# Patient Record
Sex: FEMALE | Race: WHITE | NOT HISPANIC OR LATINO
[De-identification: names, ages, dates, MRNs, and addresses within clinical notes are randomized per-mention and may not be internally consistent; named-entity substitution may affect disease eponyms.]

---

## 2021-03-10 ENCOUNTER — APPOINTMENT (OUTPATIENT)
Dept: OBGYN | Facility: CLINIC | Age: 41
End: 2021-03-10
Payer: MEDICAID

## 2021-03-10 VITALS — WEIGHT: 188.06 LBS | SYSTOLIC BLOOD PRESSURE: 145 MMHG | DIASTOLIC BLOOD PRESSURE: 86 MMHG

## 2021-03-10 DIAGNOSIS — Z78.9 OTHER SPECIFIED HEALTH STATUS: ICD-10-CM

## 2021-03-10 DIAGNOSIS — Z80.3 FAMILY HISTORY OF MALIGNANT NEOPLASM OF BREAST: ICD-10-CM

## 2021-03-10 PROCEDURE — 99203 OFFICE O/P NEW LOW 30 MIN: CPT

## 2021-03-10 PROCEDURE — 99072 ADDL SUPL MATRL&STAF TM PHE: CPT

## 2021-03-10 NOTE — COUNSELING
[Nutrition/ Exercise/ Weight Management] : nutrition, exercise, weight management [Body Image] : body image [Vitamins/Supplements] : vitamins/supplements [Sunscreen] : sunscreen [Smoking Cessation] : smoking cessation [Drugs/Alcohol] : drugs, alcohol [Breast Self Exam] : breast self exam [Bladder Hygiene] : bladder hygiene [STD (testing, results, tx)] : STD (testing, results, tx) [Vaccines] : vaccines

## 2021-03-11 LAB
C TRACH RRNA SPEC QL NAA+PROBE: NOT DETECTED
HPV HIGH+LOW RISK DNA PNL CVX: NOT DETECTED
N GONORRHOEA RRNA SPEC QL NAA+PROBE: NOT DETECTED
SOURCE TP AMPLIFICATION: NORMAL

## 2021-03-18 LAB — CYTOLOGY CVX/VAG DOC THIN PREP: ABNORMAL

## 2021-03-19 ENCOUNTER — APPOINTMENT (OUTPATIENT)
Dept: ANTEPARTUM | Facility: CLINIC | Age: 41
End: 2021-03-19
Payer: MEDICAID

## 2021-03-19 ENCOUNTER — ASOB RESULT (OUTPATIENT)
Age: 41
End: 2021-03-19

## 2021-03-19 PROCEDURE — 76830 TRANSVAGINAL US NON-OB: CPT

## 2021-03-19 PROCEDURE — 76856 US EXAM PELVIC COMPLETE: CPT | Mod: 59

## 2021-03-19 PROCEDURE — 99072 ADDL SUPL MATRL&STAF TM PHE: CPT

## 2021-03-26 ENCOUNTER — APPOINTMENT (OUTPATIENT)
Dept: OBGYN | Facility: CLINIC | Age: 41
End: 2021-03-26
Payer: MEDICAID

## 2021-03-26 VITALS
DIASTOLIC BLOOD PRESSURE: 82 MMHG | WEIGHT: 187.56 LBS | HEIGHT: 62 IN | SYSTOLIC BLOOD PRESSURE: 133 MMHG | BODY MASS INDEX: 34.52 KG/M2

## 2021-03-26 DIAGNOSIS — N92.1 EXCESSIVE AND FREQUENT MENSTRUATION WITH IRREGULAR CYCLE: ICD-10-CM

## 2021-03-26 PROCEDURE — 99072 ADDL SUPL MATRL&STAF TM PHE: CPT

## 2021-03-26 PROCEDURE — 58558Z: CUSTOM

## 2021-03-26 NOTE — PROCEDURE
[Hysteroscopy] : Hysteroscopy [Time out performed] : Pre-procedure time out performed.  Patient's name, date of birth and procedure confirmed. [Consent Obtained] : Consent obtained [Abnormal uterine bleeding] : abnormal uterine bleeding [Risks] : risks [Benefits] : benefits [Alternatives] : alternatives [Patient] : patient [Infection] : infection [Bleeding] : bleeding [Allergic Reaction] : allergic reaction [Lidocaine___ mL] : [unfilled] ~UmL of lidocaine [flexible] : Using aseptic technique a hysteroscopy was performed using a flexible hysteroscope [Sent to Pathology] : specimen was placed in buffered formalin and sent for pathology [Antibiotics given] : antibiotics not given [Hemostasis obtained] : hemostasis obtained [Tolerated Well] : Patient tolerated the procedure well [Aftercare instructions/regstrictions given and follow-up scheduled] : Aftercare instructions/restrictions given and follow-up scheduled [de-identified] : timeout performed\par under sterile conditions and with paracervical block the cervix was dilated and endometrial sampling obtained and sent to pathology\par . Hysteroscopy reveals normal endometrial contour with lush features. No polyps myoma or ulcerations noted.

## 2021-03-26 NOTE — HISTORY OF PRESENT ILLNESS
[Y] : Positive pregnancy history [Frequency: Q ___ days] : menstrual periods occur approximately every [unfilled] days [Menarche Age: ____] : age at menarche was [unfilled] [PapSmeardate] : 2018 [PGHxTotal] : 4 [[facility]] : 2 [PGHxPremature] : 0 [PGHxAbortions] : 0 [[facility] [place]] : 2 [PGHxABInduced] : 2 [PGHxABSpont] : 0 [PGHxEctopic] : 0 [PGHxMultBirths] : 0

## 2021-03-26 NOTE — PHYSICAL EXAM
[Appropriately responsive] : appropriately responsive [Alert] : alert [No Acute Distress] : no acute distress [No Lymphadenopathy] : no lymphadenopathy [Regular Rate Rhythm] : regular rate rhythm [No Murmurs] : no murmurs [Clear to Auscultation B/L] : clear to auscultation bilaterally [Soft] : soft [Non-tender] : non-tender [Non-distended] : non-distended [No HSM] : No HSM [No Lesions] : no lesions [No Mass] : no mass [Oriented x3] : oriented x3 [Examination Of The Breasts] : a normal appearance [No Masses] : no breast masses were palpable [Labia Majora] : normal [Labia Minora] : normal [Normal] : normal [Uterine Adnexae] : normal [Ovarian Mass (___ Cm)] : mass present [___] : [unfilled] cm mass on the left [Mass ___ cm] : no uterine mass was palpated

## 2021-03-30 LAB
CA 125 (LABCORP): 33.7 U/ML
HE 4: 40.7 PMOL/L
POSTMENOPAUSAL ROMA: 1.6
PREMENOPAUSAL ROMA: 0.49
ROMA COMMENT: NORMAL

## 2021-04-03 ENCOUNTER — APPOINTMENT (OUTPATIENT)
Dept: CT IMAGING | Facility: CLINIC | Age: 41
End: 2021-04-03

## 2021-04-05 LAB — CORE LAB BIOPSY: NORMAL

## 2021-04-07 ENCOUNTER — APPOINTMENT (OUTPATIENT)
Dept: OBGYN | Facility: CLINIC | Age: 41
End: 2021-04-07

## 2021-07-16 ENCOUNTER — APPOINTMENT (OUTPATIENT)
Dept: OBGYN | Facility: CLINIC | Age: 41
End: 2021-07-16
Payer: MEDICAID

## 2021-07-16 ENCOUNTER — LABORATORY RESULT (OUTPATIENT)
Age: 41
End: 2021-07-16

## 2021-07-16 VITALS
DIASTOLIC BLOOD PRESSURE: 86 MMHG | HEIGHT: 62 IN | BODY MASS INDEX: 34.23 KG/M2 | SYSTOLIC BLOOD PRESSURE: 130 MMHG | WEIGHT: 186 LBS

## 2021-07-16 DIAGNOSIS — Z97.5 PRESENCE OF (INTRAUTERINE) CONTRACEPTIVE DEVICE: ICD-10-CM

## 2021-07-16 PROCEDURE — 11982 REMOVE DRUG IMPLANT DEVICE: CPT

## 2021-07-16 PROCEDURE — 99072 ADDL SUPL MATRL&STAF TM PHE: CPT

## 2021-07-19 RX ORDER — NORETHINDRONE ACETATE AND ETHINYL ESTRADIOL AND FERROUS FUMARATE 1.5-30(21)
1.5-3 KIT ORAL
Qty: 84 | Refills: 2 | Status: ACTIVE | COMMUNITY
Start: 2021-07-19 | End: 1900-01-01

## 2021-08-10 ENCOUNTER — NON-APPOINTMENT (OUTPATIENT)
Age: 41
End: 2021-08-10

## 2021-08-11 ENCOUNTER — APPOINTMENT (OUTPATIENT)
Dept: OBGYN | Facility: CLINIC | Age: 41
End: 2021-08-11
Payer: MEDICAID

## 2021-08-11 VITALS
BODY MASS INDEX: 34.09 KG/M2 | SYSTOLIC BLOOD PRESSURE: 146 MMHG | DIASTOLIC BLOOD PRESSURE: 91 MMHG | WEIGHT: 185.25 LBS | HEIGHT: 62 IN | HEART RATE: 82 BPM

## 2021-08-11 DIAGNOSIS — N83.8 OTHER NONINFLAMMATORY DISORDERS OF OVARY, FALLOPIAN TUBE AND BROAD LIGAMENT: ICD-10-CM

## 2021-08-11 PROCEDURE — 99213 OFFICE O/P EST LOW 20 MIN: CPT

## 2021-09-21 ENCOUNTER — OUTPATIENT (OUTPATIENT)
Dept: OUTPATIENT SERVICES | Facility: HOSPITAL | Age: 41
LOS: 1 days | End: 2021-09-21
Payer: MEDICAID

## 2021-09-21 VITALS
DIASTOLIC BLOOD PRESSURE: 93 MMHG | SYSTOLIC BLOOD PRESSURE: 138 MMHG | HEART RATE: 91 BPM | HEIGHT: 60 IN | RESPIRATION RATE: 20 BRPM | WEIGHT: 183.87 LBS | TEMPERATURE: 97 F

## 2021-09-21 DIAGNOSIS — Z98.82 BREAST IMPLANT STATUS: Chronic | ICD-10-CM

## 2021-09-21 DIAGNOSIS — Z29.9 ENCOUNTER FOR PROPHYLACTIC MEASURES, UNSPECIFIED: ICD-10-CM

## 2021-09-21 DIAGNOSIS — N83.209 UNSPECIFIED OVARIAN CYST, UNSPECIFIED SIDE: ICD-10-CM

## 2021-09-21 DIAGNOSIS — Z01.818 ENCOUNTER FOR OTHER PREPROCEDURAL EXAMINATION: ICD-10-CM

## 2021-09-21 DIAGNOSIS — Z98.890 OTHER SPECIFIED POSTPROCEDURAL STATES: Chronic | ICD-10-CM

## 2021-09-21 LAB
ANION GAP SERPL CALC-SCNC: 13 MMOL/L — SIGNIFICANT CHANGE UP (ref 5–17)
BASOPHILS # BLD AUTO: 0.04 K/UL — SIGNIFICANT CHANGE UP (ref 0–0.2)
BASOPHILS NFR BLD AUTO: 0.4 % — SIGNIFICANT CHANGE UP (ref 0–2)
BLD GP AB SCN SERPL QL: SIGNIFICANT CHANGE UP
BUN SERPL-MCNC: 9.7 MG/DL — SIGNIFICANT CHANGE UP (ref 8–20)
CALCIUM SERPL-MCNC: 9.6 MG/DL — SIGNIFICANT CHANGE UP (ref 8.6–10.2)
CHLORIDE SERPL-SCNC: 101 MMOL/L — SIGNIFICANT CHANGE UP (ref 98–107)
CO2 SERPL-SCNC: 23 MMOL/L — SIGNIFICANT CHANGE UP (ref 22–29)
CREAT SERPL-MCNC: 0.86 MG/DL — SIGNIFICANT CHANGE UP (ref 0.5–1.3)
EOSINOPHIL # BLD AUTO: 0.5 K/UL — SIGNIFICANT CHANGE UP (ref 0–0.5)
EOSINOPHIL NFR BLD AUTO: 4.9 % — SIGNIFICANT CHANGE UP (ref 0–6)
GLUCOSE SERPL-MCNC: 88 MG/DL — SIGNIFICANT CHANGE UP (ref 70–99)
HCG SERPL-ACNC: <4 MIU/ML — SIGNIFICANT CHANGE UP
HCT VFR BLD CALC: 36.8 % — SIGNIFICANT CHANGE UP (ref 34.5–45)
HGB BLD-MCNC: 11.7 G/DL — SIGNIFICANT CHANGE UP (ref 11.5–15.5)
IMM GRANULOCYTES NFR BLD AUTO: 0.4 % — SIGNIFICANT CHANGE UP (ref 0–1.5)
LYMPHOCYTES # BLD AUTO: 3.13 K/UL — SIGNIFICANT CHANGE UP (ref 1–3.3)
LYMPHOCYTES # BLD AUTO: 30.9 % — SIGNIFICANT CHANGE UP (ref 13–44)
MCHC RBC-ENTMCNC: 25.4 PG — LOW (ref 27–34)
MCHC RBC-ENTMCNC: 31.8 GM/DL — LOW (ref 32–36)
MCV RBC AUTO: 80 FL — SIGNIFICANT CHANGE UP (ref 80–100)
MONOCYTES # BLD AUTO: 0.76 K/UL — SIGNIFICANT CHANGE UP (ref 0–0.9)
MONOCYTES NFR BLD AUTO: 7.5 % — SIGNIFICANT CHANGE UP (ref 2–14)
NEUTROPHILS # BLD AUTO: 5.67 K/UL — SIGNIFICANT CHANGE UP (ref 1.8–7.4)
NEUTROPHILS NFR BLD AUTO: 55.9 % — SIGNIFICANT CHANGE UP (ref 43–77)
PLATELET # BLD AUTO: 418 K/UL — HIGH (ref 150–400)
POTASSIUM SERPL-MCNC: 4.2 MMOL/L — SIGNIFICANT CHANGE UP (ref 3.5–5.3)
POTASSIUM SERPL-SCNC: 4.2 MMOL/L — SIGNIFICANT CHANGE UP (ref 3.5–5.3)
RBC # BLD: 4.6 M/UL — SIGNIFICANT CHANGE UP (ref 3.8–5.2)
RBC # FLD: 13.3 % — SIGNIFICANT CHANGE UP (ref 10.3–14.5)
SODIUM SERPL-SCNC: 137 MMOL/L — SIGNIFICANT CHANGE UP (ref 135–145)
WBC # BLD: 10.14 K/UL — SIGNIFICANT CHANGE UP (ref 3.8–10.5)
WBC # FLD AUTO: 10.14 K/UL — SIGNIFICANT CHANGE UP (ref 3.8–10.5)

## 2021-09-21 PROCEDURE — G0463: CPT

## 2021-09-21 RX ORDER — SODIUM CHLORIDE 9 MG/ML
3 INJECTION INTRAMUSCULAR; INTRAVENOUS; SUBCUTANEOUS EVERY 8 HOURS
Refills: 0 | Status: DISCONTINUED | OUTPATIENT
Start: 2021-10-11 | End: 2021-10-14

## 2021-09-21 NOTE — H&P PST ADULT - ASSESSMENT
CAPRINI SCORE [CLOT]    AGE RELATED RISK FACTORS                                                       MOBILITY RELATED FACTORS  [ ] Age 41-60 years                                            (1 Point)                  [ ] Bed rest                                                        (1 Point)  [ ] Age: 61-74 years                                           (2 Points)                 [ ] Plaster cast                                                   (2 Points)  [ ] Age= 75 years                                              (3 Points)                   [ ] Bed bound for more than 72 hours                 (2 Points)    DISEASE RELATED RISK FACTORS                                               GENDER SPECIFIC FACTORS  [ ] Edema in the lower extremities                       (1 Point)              [ ] Pregnancy                                                     (1 Point)  [ ] Varicose veins                                               (1 Point)                     [ ] Post-partum < 6 weeks                                   (1 Point)             [x ] BMI > 25 Kg/m2                                            (1 Point)                     [ ] Hormonal therapy  or oral contraception          (1 Point)                 [ ] Sepsis (in the previous month)                        (1 Point)                [ ] History of pregnancy complications                 (1 point)  [ ] Pneumonia or serious lung disease                                                [ ] Unexplained or recurrent                     (1 Point)           (in the previous month)                               (1 Point)  [ ] Abnormal pulmonary function test                     (1 Point)                 SURGERY RELATED RISK FACTORS  [ ] Acute myocardial infarction                              (1 Point)                   [ ]  Section                                             (1 Point)  [ ] Congestive heart failure (in the previous month)  (1 Point)           [ ] Minor surgery                                                  (1 Point)   [ ] Inflammatory bowel disease                             (1 Point)                   [ ] Arthroscopic surgery                                        (2 Points)  [ ] Central venous access                                      (2 Points)                    [ x] General surgery lasting more than 45 minutes   (2 Points)       [ ] Stroke (in the previous month)                          (5 Points)                 [ ] Elective arthroplasty                                         (5 Points)            [ ] Malignacy (past or present)                          (2 ponits)                                                                                                                            HEMATOLOGY RELATED FACTORS                                                 TRAUMA RELATED RISK FACTORS  [ ] Prior episodes of VTE                                     (3 Points)                [ ] Fracture of the hip, pelvis, or leg                       (5 Points)  [ ] Positive family history for VTE                         (3 Points)             [ ] Acute spinal cord injury (in the previous month)  (5 Points)  [ ] Prothrombin 89328 A                                     (3 Points)                [ ] Paralysis  (less than 1 month)                             (5 Points)  [ ] Factor V Leiden                                             (3 Points)                   [ ] Multiple Trauma within 1 month                        (5 Points)  [ ] Lupus anticoagulants                                     (3 Points)                                                           [ ] Anticardiolipin antibodies                               (3 Points)                                                       [ ] High homocysteine in the blood                      (3 Points)                                             [ ] Other congenital or acquired thrombophilia      (3 Points)                                                [ ] Heparin induced thrombocytopenia                  (3 Points)                                          Total Score [   3       ]    Caprini Score 0 - 2:  Low Risk, No VTE Prophylaxis required for most patients, encourage ambulation  Caprini Score 3 - 6:  At Risk, pharmacologic VTE prophylaxis is indicated for most patients (in the absence of a contraindication)  Caprini Score Greater than or = 7:  High Risk, pharmacologic VTE prophylaxis is indicated for most patients (in the absence of a contraindication)    41 year old female present c/o LLQ pelvic discomfort and b/l ovarian cyst.  She is now schedule for exploratory laparotomy, bilateral ovarian cystectomies, bilateral salpingectomies with Dr. Doyle on 10/11/2021    Patient educated on written and verbal preop instructions.    Patient verbalized understanding after "teach back" method of instruction were given   Medications reviewed, instructions given on what medications to take and what not to take.  Pt instructed to stop Herbals or anti-inflammatory meds one week prior to surgery and discuss with PMD.

## 2021-09-21 NOTE — H&P PST ADULT - NSICDXPASTMEDICALHX_GEN_ALL_CORE_FT
PAST MEDICAL HISTORY:  Asthma due to seasonal allergies     Ovarian cyst      PAST MEDICAL HISTORY:  Asthma due to seasonal allergies     Ovarian cyst     PCOS (polycystic ovarian syndrome)

## 2021-09-21 NOTE — H&P PST ADULT - PROBLEM SELECTOR PLAN 1
exploratory laparotomy, bilateral ovarian cystectomies, bilateral salpingectomies with Dr. Doyle on 10/11/2021

## 2021-09-21 NOTE — H&P PST ADULT - NSICDXPASTSURGICALHX_GEN_ALL_CORE_FT
PAST SURGICAL HISTORY:  H/O breast augmentation     H/O ovarian cystectomy x2     PAST SURGICAL HISTORY:  H/O breast augmentation     H/O ovarian cystectomy x2(2006,2008 )

## 2021-09-21 NOTE — H&P PST ADULT - NSICDXFAMILYHX_GEN_ALL_CORE_FT
FAMILY HISTORY:  Father  Still living? Unknown  FH: diabetes mellitus, Age at diagnosis: Age Unknown    Grandparent  Still living? Unknown  FH: diabetes mellitus, Age at diagnosis: Age Unknown    Aunt  Still living? Unknown  FHx: breast cancer, Age at diagnosis: Age Unknown

## 2021-09-21 NOTE — H&P PST ADULT - HISTORY OF PRESENT ILLNESS
41 year old female present today for PST. She c/o LLQ pelvic discomfort and b/l ovarian cyst.  She has a h/o of PCOS and has had two surgery for ovarian cystectomy.  She report every few years she would develop cyst and it grows to a size of a golf ball or bigger.  She is currently on birth control. She report LLQ pelvic discomfort.  She is now schedule for exploratory laparotomy, bilateral ovarian cystectomies, bilateral salpingectomies with Dr. Doyle on 10/11/2021

## 2021-09-22 PROBLEM — J45.909 UNSPECIFIED ASTHMA, UNCOMPLICATED: Chronic | Status: ACTIVE | Noted: 2021-09-21

## 2021-09-22 PROBLEM — E28.2 POLYCYSTIC OVARIAN SYNDROME: Chronic | Status: ACTIVE | Noted: 2021-09-21

## 2021-09-22 PROBLEM — N83.209 UNSPECIFIED OVARIAN CYST, UNSPECIFIED SIDE: Chronic | Status: ACTIVE | Noted: 2021-09-21

## 2021-10-05 DIAGNOSIS — Z01.818 ENCOUNTER FOR OTHER PREPROCEDURAL EXAMINATION: ICD-10-CM

## 2021-10-05 RX ORDER — CEFAZOLIN SODIUM 1 G
2000 VIAL (EA) INJECTION ONCE
Refills: 0 | Status: COMPLETED | OUTPATIENT
Start: 2021-10-11 | End: 2021-10-11

## 2021-10-05 RX ORDER — METRONIDAZOLE 500 MG
500 TABLET ORAL ONCE
Refills: 0 | Status: COMPLETED | OUTPATIENT
Start: 2021-10-11 | End: 2021-10-11

## 2021-10-08 ENCOUNTER — APPOINTMENT (OUTPATIENT)
Dept: DISASTER EMERGENCY | Facility: CLINIC | Age: 41
End: 2021-10-08

## 2021-10-08 LAB — SARS-COV-2 N GENE NPH QL NAA+PROBE: NOT DETECTED

## 2021-10-10 ENCOUNTER — TRANSCRIPTION ENCOUNTER (OUTPATIENT)
Age: 41
End: 2021-10-10

## 2021-10-11 ENCOUNTER — APPOINTMENT (OUTPATIENT)
Dept: OBGYN | Facility: HOSPITAL | Age: 41
End: 2021-10-11

## 2021-10-11 ENCOUNTER — RESULT REVIEW (OUTPATIENT)
Age: 41
End: 2021-10-11

## 2021-10-11 ENCOUNTER — INPATIENT (INPATIENT)
Facility: HOSPITAL | Age: 41
LOS: 2 days | Discharge: HOME | DRG: 742 | End: 2021-10-14
Attending: OBSTETRICS & GYNECOLOGY | Admitting: OBSTETRICS & GYNECOLOGY
Payer: MEDICAID

## 2021-10-11 VITALS
HEART RATE: 73 BPM | HEIGHT: 60 IN | OXYGEN SATURATION: 99 % | SYSTOLIC BLOOD PRESSURE: 154 MMHG | DIASTOLIC BLOOD PRESSURE: 94 MMHG | RESPIRATION RATE: 20 BRPM | WEIGHT: 183.87 LBS | TEMPERATURE: 99 F

## 2021-10-11 DIAGNOSIS — Z98.890 OTHER SPECIFIED POSTPROCEDURAL STATES: Chronic | ICD-10-CM

## 2021-10-11 DIAGNOSIS — Z98.82 BREAST IMPLANT STATUS: Chronic | ICD-10-CM

## 2021-10-11 DIAGNOSIS — N92.1 EXCESSIVE AND FREQUENT MENSTRUATION WITH IRREGULAR CYCLE: ICD-10-CM

## 2021-10-11 LAB — BLD GP AB SCN SERPL QL: SIGNIFICANT CHANGE UP

## 2021-10-11 PROCEDURE — 58700 REMOVAL OF FALLOPIAN TUBE: CPT

## 2021-10-11 PROCEDURE — 99221 1ST HOSP IP/OBS SF/LOW 40: CPT | Mod: 25,57

## 2021-10-11 PROCEDURE — 58925 REMOVAL OF OVARIAN CYST(S): CPT

## 2021-10-11 PROCEDURE — 88305 TISSUE EXAM BY PATHOLOGIST: CPT | Mod: 26

## 2021-10-11 PROCEDURE — 88302 TISSUE EXAM BY PATHOLOGIST: CPT | Mod: 26

## 2021-10-11 PROCEDURE — 88307 TISSUE EXAM BY PATHOLOGIST: CPT | Mod: 26

## 2021-10-11 PROCEDURE — 44140 PARTIAL REMOVAL OF COLON: CPT

## 2021-10-11 RX ORDER — OXYCODONE HYDROCHLORIDE 5 MG/1
5 TABLET ORAL
Refills: 0 | Status: DISCONTINUED | OUTPATIENT
Start: 2021-10-11 | End: 2021-10-12

## 2021-10-11 RX ORDER — ACETAMINOPHEN 500 MG
975 TABLET ORAL EVERY 6 HOURS
Refills: 0 | Status: DISCONTINUED | OUTPATIENT
Start: 2021-10-11 | End: 2021-10-12

## 2021-10-11 RX ORDER — CEFAZOLIN SODIUM 1 G
VIAL (EA) INJECTION
Refills: 0 | Status: COMPLETED | OUTPATIENT
Start: 2021-10-11 | End: 2021-10-12

## 2021-10-11 RX ORDER — METRONIDAZOLE 500 MG
500 TABLET ORAL EVERY 8 HOURS
Refills: 0 | Status: COMPLETED | OUTPATIENT
Start: 2021-10-12 | End: 2021-10-12

## 2021-10-11 RX ORDER — CEFAZOLIN SODIUM 1 G
2000 VIAL (EA) INJECTION EVERY 8 HOURS
Refills: 0 | Status: COMPLETED | OUTPATIENT
Start: 2021-10-12 | End: 2021-10-12

## 2021-10-11 RX ORDER — ACETAMINOPHEN 500 MG
975 TABLET ORAL ONCE
Refills: 0 | Status: COMPLETED | OUTPATIENT
Start: 2021-10-11 | End: 2021-10-11

## 2021-10-11 RX ORDER — METRONIDAZOLE 500 MG
TABLET ORAL
Refills: 0 | Status: COMPLETED | OUTPATIENT
Start: 2021-10-11 | End: 2021-10-12

## 2021-10-11 RX ORDER — IBUPROFEN 200 MG
600 TABLET ORAL EVERY 6 HOURS
Refills: 0 | Status: DISCONTINUED | OUTPATIENT
Start: 2021-10-11 | End: 2021-10-12

## 2021-10-11 RX ORDER — ONDANSETRON 8 MG/1
4 TABLET, FILM COATED ORAL ONCE
Refills: 0 | Status: DISCONTINUED | OUTPATIENT
Start: 2021-10-11 | End: 2021-10-11

## 2021-10-11 RX ORDER — CEFAZOLIN SODIUM 1 G
2000 VIAL (EA) INJECTION ONCE
Refills: 0 | Status: COMPLETED | OUTPATIENT
Start: 2021-10-11 | End: 2021-10-11

## 2021-10-11 RX ORDER — ENOXAPARIN SODIUM 100 MG/ML
40 INJECTION SUBCUTANEOUS EVERY 24 HOURS
Refills: 0 | Status: DISCONTINUED | OUTPATIENT
Start: 2021-10-12 | End: 2021-10-14

## 2021-10-11 RX ORDER — SIMETHICONE 80 MG/1
80 TABLET, CHEWABLE ORAL EVERY 6 HOURS
Refills: 0 | Status: DISCONTINUED | OUTPATIENT
Start: 2021-10-12 | End: 2021-10-12

## 2021-10-11 RX ORDER — ONDANSETRON 8 MG/1
8 TABLET, FILM COATED ORAL EVERY 8 HOURS
Refills: 0 | Status: DISCONTINUED | OUTPATIENT
Start: 2021-10-11 | End: 2021-10-12

## 2021-10-11 RX ORDER — SENNA PLUS 8.6 MG/1
1 TABLET ORAL AT BEDTIME
Refills: 0 | Status: DISCONTINUED | OUTPATIENT
Start: 2021-10-12 | End: 2021-10-12

## 2021-10-11 RX ORDER — GABAPENTIN 400 MG/1
300 CAPSULE ORAL EVERY 8 HOURS
Refills: 0 | Status: DISCONTINUED | OUTPATIENT
Start: 2021-10-11 | End: 2021-10-12

## 2021-10-11 RX ORDER — SODIUM CHLORIDE 9 MG/ML
1000 INJECTION, SOLUTION INTRAVENOUS
Refills: 0 | Status: DISCONTINUED | OUTPATIENT
Start: 2021-10-11 | End: 2021-10-12

## 2021-10-11 RX ORDER — OXYCODONE HYDROCHLORIDE 5 MG/1
10 TABLET ORAL EVERY 4 HOURS
Refills: 0 | Status: DISCONTINUED | OUTPATIENT
Start: 2021-10-11 | End: 2021-10-12

## 2021-10-11 RX ORDER — METRONIDAZOLE 500 MG
500 TABLET ORAL ONCE
Refills: 0 | Status: COMPLETED | OUTPATIENT
Start: 2021-10-11 | End: 2021-10-11

## 2021-10-11 RX ORDER — HYDROMORPHONE HYDROCHLORIDE 2 MG/ML
0.5 INJECTION INTRAMUSCULAR; INTRAVENOUS; SUBCUTANEOUS
Refills: 0 | Status: DISCONTINUED | OUTPATIENT
Start: 2021-10-11 | End: 2021-10-11

## 2021-10-11 RX ORDER — SODIUM CHLORIDE 9 MG/ML
1000 INJECTION, SOLUTION INTRAVENOUS
Refills: 0 | Status: DISCONTINUED | OUTPATIENT
Start: 2021-10-11 | End: 2021-10-11

## 2021-10-11 RX ADMIN — Medication 100 MILLIGRAM(S): at 20:58

## 2021-10-11 RX ADMIN — Medication 975 MILLIGRAM(S): at 21:55

## 2021-10-11 RX ADMIN — HYDROMORPHONE HYDROCHLORIDE 0.5 MILLIGRAM(S): 2 INJECTION INTRAMUSCULAR; INTRAVENOUS; SUBCUTANEOUS at 18:27

## 2021-10-11 RX ADMIN — HYDROMORPHONE HYDROCHLORIDE 0.5 MILLIGRAM(S): 2 INJECTION INTRAMUSCULAR; INTRAVENOUS; SUBCUTANEOUS at 18:48

## 2021-10-11 RX ADMIN — OXYCODONE HYDROCHLORIDE 10 MILLIGRAM(S): 5 TABLET ORAL at 23:33

## 2021-10-11 RX ADMIN — HYDROMORPHONE HYDROCHLORIDE 0.5 MILLIGRAM(S): 2 INJECTION INTRAMUSCULAR; INTRAVENOUS; SUBCUTANEOUS at 18:17

## 2021-10-11 RX ADMIN — Medication 975 MILLIGRAM(S): at 13:34

## 2021-10-11 RX ADMIN — OXYCODONE HYDROCHLORIDE 10 MILLIGRAM(S): 5 TABLET ORAL at 22:33

## 2021-10-11 RX ADMIN — GABAPENTIN 300 MILLIGRAM(S): 400 CAPSULE ORAL at 22:19

## 2021-10-11 RX ADMIN — Medication 100 MILLIGRAM(S): at 14:35

## 2021-10-11 RX ADMIN — Medication 975 MILLIGRAM(S): at 21:02

## 2021-10-11 RX ADMIN — Medication 200 MILLIGRAM(S): at 14:45

## 2021-10-11 RX ADMIN — Medication 600 MILLIGRAM(S): at 23:48

## 2021-10-11 RX ADMIN — Medication 100 MILLIGRAM(S): at 21:44

## 2021-10-11 RX ADMIN — HYDROMORPHONE HYDROCHLORIDE 0.5 MILLIGRAM(S): 2 INJECTION INTRAMUSCULAR; INTRAVENOUS; SUBCUTANEOUS at 18:57

## 2021-10-11 RX ADMIN — HYDROMORPHONE HYDROCHLORIDE 0.5 MILLIGRAM(S): 2 INJECTION INTRAMUSCULAR; INTRAVENOUS; SUBCUTANEOUS at 18:47

## 2021-10-11 RX ADMIN — HYDROMORPHONE HYDROCHLORIDE 0.5 MILLIGRAM(S): 2 INJECTION INTRAMUSCULAR; INTRAVENOUS; SUBCUTANEOUS at 18:38

## 2021-10-11 RX ADMIN — OXYCODONE HYDROCHLORIDE 10 MILLIGRAM(S): 5 TABLET ORAL at 18:59

## 2021-10-11 RX ADMIN — OXYCODONE HYDROCHLORIDE 10 MILLIGRAM(S): 5 TABLET ORAL at 19:59

## 2021-10-11 NOTE — BRIEF OPERATIVE NOTE - OPERATION/FINDINGS
Pt here for left ovariancystectomy iatrogenic sharp injury to sigmoid colon, no evidence of thermal injury. resection of 10cm of sigmoid colon for iatrogenic 60% full thickness perforation with 4cm surrounding deserosalization. Isoperistaltic colocolonic anastomosis with 2 layer handsewn closure of common colotomy. EBL 10ccs for ACS portion
Bilateral ovarian massess consistent with dermoid cysts filled with sebacous fluid and hair. Sigmoid colon adhered to left ovary resulting in iatrogenic perforation with immediate recognition and repair by Dr. Hyman. Right salpingectomy performed. Left fallopian tube indiscernible from left ovary. Otherwise grossly normal uterus.

## 2021-10-11 NOTE — CONSULT NOTE ADULT - SUBJECTIVE AND OBJECTIVE BOX
GENERAL SURGERY CONSULT    Consulting surgical team: ACS - Acute Care Surgery (pager [phone])  Consulting attending:  Patient seen and examined: 10-11-21 @ 17:34    HPI:  Patient is a 41y Female here for L ovariancystectomy b/l sapingectomy, intraoperative consult to ACS for iatrogenic sigmoid colon full thickness perforation.   ACS performed resection of 10cm of sigmoid colon for iatrogenic 60% full thickness perforation with 4cm surrounding deserosalization. Isoperistaltic colocolonic anastomosis with 2 layer handsewn closure of common colotomy    PAST MEDICAL HISTORY:  Ovarian cyst    Asthma due to seasonal allergies    PCOS (polycystic ovarian syndrome)        PAST SURGICAL HISTORY:  H/O ovarian cystectomy    H/O breast augmentation        ALLERGIES:  No Known Allergies      MEDICATIONS:  ceFAZolin   IVPB 2000 milliGRAM(s) IV Intermittent once  metroNIDAZOLE  IVPB 500 milliGRAM(s) IV Intermittent once      VITALS & I/Os:  Vital Signs Last 24 Hrs  T(C): 37.2 (11 Oct 2021 13:10), Max: 37.2 (11 Oct 2021 13:10)  T(F): 98.9 (11 Oct 2021 13:10), Max: 98.9 (11 Oct 2021 13:10)  HR: 73 (11 Oct 2021 13:10) (73 - 73)  BP: 154/94 (11 Oct 2021 13:10) (154/94 - 154/94)  BP(mean): --  RR: 20 (11 Oct 2021 13:10) (20 - 20)  SpO2: 99% (11 Oct 2021 13:10) (99% - 99%)    I&O's Summary      PHYSICAL EXAM:  General: No acute distress  Respiratory: Nonlabored  Cardiovascular: RRR  Abdominal:   Extremities: Warm  Vascular:    LABS:          Lactate:                    IMAGING:    
ACUTE CARE SURGERY CONSULT     HPI: 41y Female with history of multiple ovarian cystectomies who [facility] ex [facility], ovarian cystectomy, intraoperatively had a large left teratoma with multiple dense adhesions to abdominal organs, of most significance sigmoid colon, during dissection concern for full thickness injury of colon.  Due to this even there was an intraoperative consult called. Intraop assessment effectively showed a full thickness injury of approximately 60% of circumference of sigmoid colon.    ROS: unable to obtain patient intubated under General Anesthesia     PAST MEDICAL & SURGICAL HISTORY:  Ovarian cyst    Asthma due to seasonal allergies    PCOS (polycystic ovarian syndrome)    H/O ovarian cystectomy  x2(2006,2008 )    H/O breast augmentation      FAMILY HISTORY:  FH: diabetes mellitus (Grandparent, Father)    FHx: breast cancer (Aunt)      Family history not pertinent as reviewed with the patient.    SOCIAL HISTORY:  Denies any toxic habits    ALLERGIES: NKA No Known Allergies      HOME MEDICATIONS: ***  Home Medications:  JUNEL FE 1.5/30  1.5-30 MG-MCG TABS:  (11 Oct 2021 13:26)      --------------------------------------------------------------------------------------------    PHYSICAL EXAM:   General: Intubated under general anesthesia, in OR table  Abdomen: Open, evidence of full thickness injury of sigmoid colon of approximate 60% of circumference. edges viable  --------------------------------------------------------------------------------------------                    ASSESSMENT: Patient is a 41y old female with a iatrogenic injury of sigmoid colon, consulted intraoperatively, will need resection due to extent of compromise of circumference.     PLAN:    - Repair of iatrgogenic injury  - full details to follow in intraop note

## 2021-10-11 NOTE — BRIEF OPERATIVE NOTE - NSICDXBRIEFPREOP_GEN_ALL_CORE_FT
PRE-OP DIAGNOSIS:  Ovarian mass 11-Oct-2021 18:14:48  Jay Liang  
PRE-OP DIAGNOSIS:  Colon perforation 11-Oct-2021 17:29:54  Saul Coy

## 2021-10-11 NOTE — CONSULT NOTE ADULT - ATTENDING COMMENTS
patient seen and examined in the operating room. please refer to operative dictation. sigmoid colon resected, stapled anastamosis created. will follow. ok for CLD. management per GYN team

## 2021-10-11 NOTE — CHART NOTE - NSCHARTNOTEFT_GEN_A_CORE
POST-OPERATIVE NOTE    Subjective: Patient states pain is controlled, tolerated some clear liquids, peña in place, has not ambulated yet, afebrile  Patient is s/p exploratory laparotomy, right salpingectomy, b/l ovarian cystectomies with a colonic full thickness injury, repaired with resection and anastomosis     Vital Signs Last 24 Hrs  T(C): 36.3 (11 Oct 2021 20:00), Max: 37.2 (11 Oct 2021 13:10)  T(F): 97.4 (11 Oct 2021 20:00), Max: 98.9 (11 Oct 2021 13:10)  HR: 70 (11 Oct 2021 20:00) (64 - 85)  BP: 124/77 (11 Oct 2021 20:00) (101/62 - 154/94)  BP(mean): 70 (11 Oct 2021 19:40) (62 - 73)  RR: 18 (11 Oct 2021 20:00) (12 - 20)  SpO2: 96% (11 Oct 2021 20:00) (95% - 100%)  I&O's Detail    ceFAZolin   IVPB   metroNIDAZOLE  IVPB   ceFAZolin   IVPB   metroNIDAZOLE  IVPB     PAST MEDICAL & SURGICAL HISTORY:  Ovarian cyst    Asthma due to seasonal allergies    PCOS (polycystic ovarian syndrome)    H/O ovarian cystectomy  x2(2006,2008 )    H/O breast augmentation          Physical Exam:  General: NAD, resting comfortably in bed  Pulmonary: Nonlabored breathing, no respiratory distress  Cardiovascular: NSR  Abdominal: soft, NT/ND, no rebound or guarding, Incision is clean, dry, intact  Extremities: WWP                  Assessment:  The patient is a 41y Female who is now several hours post-op from a exploratory laparotomy, right salpingectomy, b/l ovarian cystectomies with a colonic full thickness injury, repaired with resection and anastomosis from surgical standpoint can have CLD and ADAT      Plan:  - Ok for CLD from surgery  - ADAT based on ROBF  - No need for IV ABx after perioperative period  - Rest of care per primary

## 2021-10-11 NOTE — BRIEF OPERATIVE NOTE - NSICDXBRIEFPROCEDURE_GEN_ALL_CORE_FT
PROCEDURES:  Colectomy, open 11-Oct-2021 17:28:36 resection of 10cm of sigmoid colon for iatrogenic 60% full thickness perforation with 4cm surrounding deserosalization. Isoperistaltic colocolonic anastomosis with 2 layer handsewn closure of common colotomy. Saul Coy  
PROCEDURES:  Exploratory laparotomy 11-Oct-2021 18:12:15  Jay Liang  Open bilateral ovarian cystectomy 11-Oct-2021 18:12:28  Jay Liang  Right salpingectomy 11-Oct-2021 18:13:02  Jay Liang  Abdominal washout 11-Oct-2021 18:13:12  Jay Liang

## 2021-10-11 NOTE — CONSULT NOTE ADULT - ASSESSMENT
41F here for elective b/l oophorectomy/salpingectomy, intraoperative ACS consult for iatrogenic sigmoid perforation POD0 resection of 10cm of sigmoid colon for iatrogenic 60% full thickness perforation with 4cm surrounding deserosalization. Isoperistaltic colocolonic anastomosis with 2 layer handsewn closure of common colotomy  NPO today sips/chips  CLD in AM and ADAT if tolerating with return of bowel function  No indication for antibiotics past 24hrs postop from general surgery standpoint  rest of care per GYN

## 2021-10-11 NOTE — PROGRESS NOTE ADULT - ASSESSMENT
NAE PROCTOR is a 41y now POD#0 s/p exploratory laparotomy, right salpingectomy, b/l ovarian cystectomies c/b bowel perforation s/p repair.    A/P:   Neuro: Pain well controlled. Continue current pain regimen.  CV: No history of cardiovascular disease. Blood pressure well controlled.  Pulm: No active disease. Saturating well on room air. Incentive spirometer use encouraged  GI: No active disease. Tolerating PO fluid. NPO except for water/ice chips. Advance to clears in AM.   : Hernandez in place. TOV after removal @0600.  Heme:  AM labs pending  ID: Afebrile. Ancef and flagyl ordered.  Endo: No active disease.   FEN: IVF at 125. Will discontinue IVF when tolerating PO. Electrolytes WNL. AM labs pending.   Skin: No active disease.   Psych: No active disease.   DVT ppx: Ambulation encouraged, SCDs when in bed, lovenox ordered.  Dispo: Pending labs and AM rounds.

## 2021-10-11 NOTE — PROGRESS NOTE ADULT - SUBJECTIVE AND OBJECTIVE BOX
NAE PROCTOR is a 41y now POD#0 s/p exploratory laparotomy, right salpingectomy, b/l ovarian cystectomies c/b bowel perforation s/p repair.    S:    Patient was seen and examined at bedside.   Patient has no complaints.  Pain is well controlled with current treatment regimen.   Tolerating regular diet, denies N/V.   Not yet ambulating.  - flatus/-BM/peña in place  She denies lightheadedness, dizziness, palpitations, chest pain and SOB.     O:   T(C): 36.3 (10-11-21 @ 20:00), Max: 37.2 (10-11-21 @ 13:10)  HR: 70 (10-11-21 @ 20:00) (64 - 85)  BP: 124/77 (10-11-21 @ 20:00) (101/62 - 154/94)  RR: 18 (10-11-21 @ 20:00) (12 - 20)  SpO2: 96% (10-11-21 @ 20:00) (95% - 100%)    Gen: NAD, AAOx3  Resp: breathing comfortably on RA  Abdomen: Soft, nondistended, appropriately tender  Incision: Pressure dressing in place - Clean, dry  Extremities: No calf tenderness or edema. SCDs in place.    Labs:   AM labs pending

## 2021-10-11 NOTE — BRIEF OPERATIVE NOTE - NSICDXBRIEFPOSTOP_GEN_ALL_CORE_FT
POST-OP DIAGNOSIS:  Teratoma of right ovary 11-Oct-2021 18:15:00  Jay Liang  
POST-OP DIAGNOSIS:  Colon perforation 11-Oct-2021 17:30:00  Saul Coy

## 2021-10-12 LAB
ANION GAP SERPL CALC-SCNC: 19 MMOL/L — HIGH (ref 5–17)
BUN SERPL-MCNC: 6.9 MG/DL — LOW (ref 8–20)
CALCIUM SERPL-MCNC: 8.7 MG/DL — SIGNIFICANT CHANGE UP (ref 8.6–10.2)
CHLORIDE SERPL-SCNC: 98 MMOL/L — SIGNIFICANT CHANGE UP (ref 98–107)
CO2 SERPL-SCNC: 19 MMOL/L — LOW (ref 22–29)
COVID-19 SPIKE DOMAIN AB INTERP: POSITIVE
COVID-19 SPIKE DOMAIN ANTIBODY RESULT: 48.9 U/ML — HIGH
CREAT SERPL-MCNC: 0.74 MG/DL — SIGNIFICANT CHANGE UP (ref 0.5–1.3)
GLUCOSE SERPL-MCNC: 143 MG/DL — HIGH (ref 70–99)
HCT VFR BLD CALC: 31.4 % — LOW (ref 34.5–45)
HGB BLD-MCNC: 10.3 G/DL — LOW (ref 11.5–15.5)
MAGNESIUM SERPL-MCNC: 1.4 MG/DL — LOW (ref 1.6–2.6)
MCHC RBC-ENTMCNC: 26.4 PG — LOW (ref 27–34)
MCHC RBC-ENTMCNC: 32.8 GM/DL — SIGNIFICANT CHANGE UP (ref 32–36)
MCV RBC AUTO: 80.5 FL — SIGNIFICANT CHANGE UP (ref 80–100)
PHOSPHATE SERPL-MCNC: 2.3 MG/DL — LOW (ref 2.4–4.7)
PLATELET # BLD AUTO: 382 K/UL — SIGNIFICANT CHANGE UP (ref 150–400)
POTASSIUM SERPL-MCNC: 4 MMOL/L — SIGNIFICANT CHANGE UP (ref 3.5–5.3)
POTASSIUM SERPL-SCNC: 4 MMOL/L — SIGNIFICANT CHANGE UP (ref 3.5–5.3)
RBC # BLD: 3.9 M/UL — SIGNIFICANT CHANGE UP (ref 3.8–5.2)
RBC # FLD: 12.5 % — SIGNIFICANT CHANGE UP (ref 10.3–14.5)
SARS-COV-2 IGG+IGM SERPL QL IA: 48.9 U/ML — HIGH
SARS-COV-2 IGG+IGM SERPL QL IA: POSITIVE
SODIUM SERPL-SCNC: 136 MMOL/L — SIGNIFICANT CHANGE UP (ref 135–145)
WBC # BLD: 18.43 K/UL — HIGH (ref 3.8–10.5)
WBC # FLD AUTO: 18.43 K/UL — HIGH (ref 3.8–10.5)

## 2021-10-12 PROCEDURE — 99024 POSTOP FOLLOW-UP VISIT: CPT | Mod: GC

## 2021-10-12 RX ORDER — ACETAMINOPHEN 500 MG
1000 TABLET ORAL EVERY 6 HOURS
Refills: 0 | Status: COMPLETED | OUTPATIENT
Start: 2021-10-12 | End: 2021-10-13

## 2021-10-12 RX ORDER — SODIUM CHLORIDE 9 MG/ML
1000 INJECTION, SOLUTION INTRAVENOUS
Refills: 0 | Status: DISCONTINUED | OUTPATIENT
Start: 2021-10-12 | End: 2021-10-14

## 2021-10-12 RX ORDER — HYDROMORPHONE HYDROCHLORIDE 2 MG/ML
0.5 INJECTION INTRAMUSCULAR; INTRAVENOUS; SUBCUTANEOUS
Refills: 0 | Status: DISCONTINUED | OUTPATIENT
Start: 2021-10-12 | End: 2021-10-14

## 2021-10-12 RX ORDER — ONDANSETRON 8 MG/1
4 TABLET, FILM COATED ORAL EVERY 4 HOURS
Refills: 0 | Status: DISCONTINUED | OUTPATIENT
Start: 2021-10-12 | End: 2021-10-14

## 2021-10-12 RX ORDER — HYDROMORPHONE HYDROCHLORIDE 2 MG/ML
1 INJECTION INTRAMUSCULAR; INTRAVENOUS; SUBCUTANEOUS
Refills: 0 | Status: DISCONTINUED | OUTPATIENT
Start: 2021-10-12 | End: 2021-10-14

## 2021-10-12 RX ADMIN — Medication 100 MILLIGRAM(S): at 15:02

## 2021-10-12 RX ADMIN — Medication 100 MILLIGRAM(S): at 05:16

## 2021-10-12 RX ADMIN — GABAPENTIN 300 MILLIGRAM(S): 400 CAPSULE ORAL at 05:15

## 2021-10-12 RX ADMIN — Medication 600 MILLIGRAM(S): at 05:19

## 2021-10-12 RX ADMIN — OXYCODONE HYDROCHLORIDE 5 MILLIGRAM(S): 5 TABLET ORAL at 06:14

## 2021-10-12 RX ADMIN — Medication 975 MILLIGRAM(S): at 08:56

## 2021-10-12 RX ADMIN — Medication 975 MILLIGRAM(S): at 09:26

## 2021-10-12 RX ADMIN — ENOXAPARIN SODIUM 40 MILLIGRAM(S): 100 INJECTION SUBCUTANEOUS at 05:18

## 2021-10-12 RX ADMIN — Medication 1000 MILLIGRAM(S): at 17:53

## 2021-10-12 RX ADMIN — SIMETHICONE 80 MILLIGRAM(S): 80 TABLET, CHEWABLE ORAL at 08:55

## 2021-10-12 RX ADMIN — SODIUM CHLORIDE 3 MILLILITER(S): 9 INJECTION INTRAMUSCULAR; INTRAVENOUS; SUBCUTANEOUS at 05:24

## 2021-10-12 RX ADMIN — ONDANSETRON 4 MILLIGRAM(S): 8 TABLET, FILM COATED ORAL at 10:11

## 2021-10-12 RX ADMIN — Medication 600 MILLIGRAM(S): at 00:29

## 2021-10-12 RX ADMIN — SODIUM CHLORIDE 125 MILLILITER(S): 9 INJECTION, SOLUTION INTRAVENOUS at 02:32

## 2021-10-12 RX ADMIN — HYDROMORPHONE HYDROCHLORIDE 1 MILLIGRAM(S): 2 INJECTION INTRAMUSCULAR; INTRAVENOUS; SUBCUTANEOUS at 12:53

## 2021-10-12 RX ADMIN — Medication 400 MILLIGRAM(S): at 17:38

## 2021-10-12 RX ADMIN — Medication 100 MILLIGRAM(S): at 15:03

## 2021-10-12 RX ADMIN — OXYCODONE HYDROCHLORIDE 5 MILLIGRAM(S): 5 TABLET ORAL at 05:14

## 2021-10-12 RX ADMIN — ONDANSETRON 4 MILLIGRAM(S): 8 TABLET, FILM COATED ORAL at 22:20

## 2021-10-12 RX ADMIN — Medication 100 MILLIGRAM(S): at 05:19

## 2021-10-12 RX ADMIN — SODIUM CHLORIDE 125 MILLILITER(S): 9 INJECTION, SOLUTION INTRAVENOUS at 17:39

## 2021-10-12 RX ADMIN — Medication 600 MILLIGRAM(S): at 05:24

## 2021-10-12 RX ADMIN — Medication 400 MILLIGRAM(S): at 23:51

## 2021-10-12 RX ADMIN — HYDROMORPHONE HYDROCHLORIDE 1 MILLIGRAM(S): 2 INJECTION INTRAMUSCULAR; INTRAVENOUS; SUBCUTANEOUS at 13:08

## 2021-10-12 NOTE — PROGRESS NOTE ADULT - SUBJECTIVE AND OBJECTIVE BOX
NAE PROCTOR is a 41y now POD#1 s/p exploratory lap, BS, bilateral ovarian cystectomies    S:    No acute events overnight.   Patient was seen and examined at bedside.   Patient has no complaints this AM.   Pain is well controlled with current treatment regimen.   Tolerating regular diet, denies N/V.   Has yet to ambulate.   -flatus/-BM/+ voiding  She denies lightheadedness, dizziness, palpitations, chest pain and SOB.     O:   T(C): 37.3 (10-12-21 @ 03:53), Max: 37.3 (10-12-21 @ 03:53)  HR: 90 (10-12-21 @ 03:53) (64 - 90)  BP: 131/78 (10-12-21 @ 03:53) (101/62 - 154/94)  RR: 18 (10-12-21 @ 03:53) (12 - 20)  SpO2: 97% (10-12-21 @ 03:53) (95% - 100%)    Gen: NAD, AAOx3  CV: RRR, S1 S2 present  Pulm: CTAB  Abdomen: Soft, nondistended, appropriately tender, + BS   Pelvic: Pad inspected with minimal amount of dark red blood   Incision: Clean, dry and intact  Extremities: No calf tenderness or edema     Labs:       10-11-21 @ 07:01  -  10-12-21 @ 07:00  --------------------------------------------------------  IN: 0 mL / OUT: 800 mL / NET: -800 mL               NAE PROCTOR is a 41y now POD#1 s/p exploratory lap, BS, bilateral ovarian cystectomies, with sigmoid resection and side-to-side re-anastomosis    S:    No acute events overnight.   Patient was seen and examined at bedside.   Patient has no complaints this AM.   Pain is well controlled with current treatment regimen.   Tolerating sips of water. Denies N/V.   Has yet to ambulate.   -flatus/-BM/+ voiding  She denies lightheadedness, dizziness, palpitations, chest pain and SOB.     O:   T(C): 37.3 (10-12-21 @ 03:53), Max: 37.3 (10-12-21 @ 03:53)  HR: 90 (10-12-21 @ 03:53) (64 - 90)  BP: 131/78 (10-12-21 @ 03:53) (101/62 - 154/94)  RR: 18 (10-12-21 @ 03:53) (12 - 20)  SpO2: 97% (10-12-21 @ 03:53) (95% - 100%)    Gen: NAD, AAOx3  CV: RRR, S1 S2 present  Pulm: CTAB  Abdomen: Soft, nondistended, appropriately tender, + BS   Pelvic: Pad inspected with minimal amount of dark red blood   Incision: Clean, dry and intact  Extremities: No calf tenderness or edema     Labs:       10-11-21 @ 07:01  -  10-12-21 @ 07:00  --------------------------------------------------------  IN: 0 mL / OUT: 800 mL / NET: -800 mL               NAE PROCTOR is a 41y now POD#1 s/p exploratory lap, BS, bilateral ovarian cystectomies, with sigmoid resection and side-to-side re-anastomosis    S:    No acute events overnight.   Patient was seen and examined at bedside.   Patient has no complaints this AM.   Pain is well controlled with current treatment regimen.   Tolerating sips of water. Denies N/V.   Has yet to ambulate.   -flatus/-BM/+ voiding  She denies lightheadedness, dizziness, palpitations, chest pain and SOB.     O:   T(C): 37.3 (10-12-21 @ 03:53), Max: 37.3 (10-12-21 @ 03:53)  HR: 90 (10-12-21 @ 03:53) (64 - 90)  BP: 131/78 (10-12-21 @ 03:53) (101/62 - 154/94)  RR: 18 (10-12-21 @ 03:53) (12 - 20)  SpO2: 97% (10-12-21 @ 03:53) (95% - 100%)    Gen: NAD, AAOx3  CV: RRR, S1 S2 present  Pulm: CTAB  Abdomen: Soft, nondistended, appropriately tender, + BS   Pelvic: Pad inspected with minimal amount of dark red blood   Incision: Clean, dry and intact, pressure dressing removed.  Extremities: No calf tenderness or edema     Labs:       10-11-21 @ 07:01  -  10-12-21 @ 07:00  --------------------------------------------------------  IN: 0 mL / OUT: 800 mL / NET: -800 mL

## 2021-10-12 NOTE — PROGRESS NOTE ADULT - ASSESSMENT
41F here for elective b/l oophorectomy/salpingectomy, intraoperative ACS consult for iatrogenic sigmoid perforation POD1 resection of 10cm of sigmoid colon for iatrogenic 60% full thickness perforation with 4cm surrounding deserosalization. Isoperistaltic colocolonic anastomosis with 2 layer handsewn closure of common colotomy      CLD  and ADAT if tolerating with return of bowel function  No indication for antibiotics past 24hrs postop from general surgery standpoint  rest of care per GYN

## 2021-10-12 NOTE — PROGRESS NOTE ADULT - ASSESSMENT
A/P: NAE PROCTOR is a 41y now POD#1 s/p exploratory lap, BS, bilateral ovarian cystectomies  Neuro: Pain well controlled. Continue current pain regimen.  CV: No history of cardiovascular disease. Blood pressure well controlled.  Pulm: No active disease. Saturating well on room air. Incentive spirometer use encouraged  GI: No active disease. Bowel sounds and function normal, tolerating PO diet. Continue current bowel regimen.   : Hernandez removed, voiding spontaneously.  Heme: Hgb 11.7 -> AM labs pending  ID: Afebrile. No antibiotics indicated at this time.   Endo: No active disease.   FEN: IVF to be discontinued, pt tolerating PO. Electrolytes WNL. AM labs pending.   Skin: No active disease.   Psych: No active disease.   DVT ppx: Ambulation encouraged, SCDs when in bed, lovenox ordered.  Dispo: Pending labs and AM rounds.    NAE PROCTOR is a 41y now POD#1 s/p exploratory lap, BS, bilateral ovarian cystectomies, with sigmoid resection and side-to-side re-anastomosis    Neuro: Pain well controlled. Continue current pain regimen.  CV: No history of cardiovascular disease. Blood pressure well controlled.  Pulm: No active disease. Saturating well on room air. Incentive spirometer use encouraged  GI: s/p sigmoid resection with side-to-side re-anastomosis. Bowel sounds present. No N/V. Plan to advance to CLD today. Appreciate ACS recommendations.   : Hernandez removed, voiding spontaneously.  Heme: Hgb 11.7 -> AM labs pending  ID: Afebrile. No antibiotics indicated at this time.   Endo: No active disease.   FEN: IVF to be discontinued. Electrolytes WNL. AM labs pending.   Skin: No active disease.   Psych: No active disease.   DVT ppx: Ambulation encouraged, SCDs when in bed, lovenox ordered.  Dispo: continue inpatient care   NAE PROCTOR is a 41y now POD#1 s/p exploratory lap, BS, bilateral ovarian cystectomies, with sigmoid resection and side-to-side re-anastomosis    Neuro: Pain well controlled. Continue current pain regimen.  CV: No history of cardiovascular disease. Blood pressure well controlled.  Pulm: No active disease. Saturating well on room air. Incentive spirometer use encouraged  GI: s/p sigmoid resection with side-to-side re-anastomosis. Bowel sounds present. No N/V. Plan to advance to CLD today. Appreciate ACS recommendations.   : Hernandez removed, voiding spontaneously.  Heme: Hgb 11.7 -> AM labs pending  ID: Afebrile. No antibiotics indicated at this time.   Endo: No active disease.   FEN: IVF to be discontinued. Electrolytes WNL. AM labs pending.   DVT ppx: Ambulation encouraged, SCDs when in bed, lovenox ordered.  Dispo: continue inpatient care, when meeting post op milestones and pending attending approval

## 2021-10-12 NOTE — PROGRESS NOTE ADULT - SUBJECTIVE AND OBJECTIVE BOX
HPI/OVERNIGHT EVENTS: Patient seen and examined at bedside this AM. Tolerated sips of clears yesterday, paincontrolled, has a peña afebrile    Vital Signs Last 24 Hrs  T(C): 36.6 (12 Oct 2021 00:03), Max: 37.2 (11 Oct 2021 13:10)  T(F): 97.8 (12 Oct 2021 00:03), Max: 98.9 (11 Oct 2021 13:10)  HR: 88 (12 Oct 2021 00:03) (64 - 88)  BP: 122/76 (12 Oct 2021 00:03) (101/62 - 154/94)  BP(mean): 70 (11 Oct 2021 19:40) (62 - 73)  RR: 18 (12 Oct 2021 00:03) (12 - 20)  SpO2: 95% (12 Oct 2021 00:03) (95% - 100%)    I&O's Detail      Constitutional: patient resting comfortably in bed, in no acute distress  HEENT: EOMI, PERRLA, MMM.  Respiratory: Non labored breathing on RA  Cardiovascular: RRR  Gastrointestinal: Abdomen soft, non-tender, non-distended, no rebound tenderness / guarding dressing C/D/I  Musculoskeletal: No joint pain, swelling or deformity; no limitation of movement  Vascular: Extremities warm and well perfused.     LABS:                MEDICATIONS  (STANDING):  acetaminophen   Tablet .. 975 milliGRAM(s) Oral every 6 hours  ceFAZolin   IVPB      ceFAZolin   IVPB 2000 milliGRAM(s) IV Intermittent every 8 hours  enoxaparin Injectable 40 milliGRAM(s) SubCutaneous every 24 hours  gabapentin 300 milliGRAM(s) Oral every 8 hours  ibuprofen  Tablet. 600 milliGRAM(s) Oral every 6 hours  lactated ringers. 1000 milliLiter(s) (125 mL/Hr) IV Continuous <Continuous>  metroNIDAZOLE  IVPB      metroNIDAZOLE  IVPB 500 milliGRAM(s) IV Intermittent every 8 hours  senna 1 Tablet(s) Oral at bedtime  sodium chloride 0.9% lock flush 3 milliLiter(s) IV Push every 8 hours    MEDICATIONS  (PRN):  acetaminophen   Tablet .. 975 milliGRAM(s) Oral every 6 hours PRN Mild Pain (1 - 3)  ondansetron    Tablet 8 milliGRAM(s) Oral every 8 hours PRN Nausea and/or Vomiting  oxyCODONE    IR 5 milliGRAM(s) Oral every 3 hours PRN Moderate Pain (4 - 6)  oxyCODONE    IR 10 milliGRAM(s) Oral every 4 hours PRN Severe Pain (7 - 10)  simethicone 80 milliGRAM(s) Chew every 6 hours PRN Gas

## 2021-10-12 NOTE — CHART NOTE - NSCHARTNOTEFT_GEN_A_CORE
S: Pt seen and examined at bedside. She is doing better, pain is under control with medication. She had an episode of nausea and vomiting earlier but has now resolved. She has been NPO and has been tolerating water and ice chips without nausea.  She is voiding, however not yet passed flatus.    O:  T(F): 97.8 (12 Oct 2021 12:53), Max: 99.2 (12 Oct 2021 03:53)  HR: 84 (12 Oct 2021 12:53) (64 - 90)  BP: 124/73 (12 Oct 2021 12:53) (101/62 - 131/78)  RR: 18 (12 Oct 2021 12:53) (12 - 20)  SpO2: 95% (12 Oct 2021 08:37) (95% - 100%)    Gen: NAD, AAOx3  CV: RRR, S1 S2 present  Pulm: CTAB  Abdomen: Soft, nondistended, appropriately tender, + BS   Pelvic: Pad inspected with minimal amount of dark red blood   Incision: Clean, dry and intact, pressure dressing removed.  Extremities: No calf tenderness or edema    A/P  Pt tolerating water and ice chips  Plan to advance to CLD diet, per ACS recs  Pain controlled with current regimen  c/w routine post op care

## 2021-10-13 LAB
ANION GAP SERPL CALC-SCNC: 8 MMOL/L — SIGNIFICANT CHANGE UP (ref 5–17)
BASOPHILS # BLD AUTO: 0.03 K/UL — SIGNIFICANT CHANGE UP (ref 0–0.2)
BASOPHILS NFR BLD AUTO: 0.2 % — SIGNIFICANT CHANGE UP (ref 0–2)
BUN SERPL-MCNC: 5.3 MG/DL — LOW (ref 8–20)
CALCIUM SERPL-MCNC: 7.9 MG/DL — LOW (ref 8.6–10.2)
CHLORIDE SERPL-SCNC: 104 MMOL/L — SIGNIFICANT CHANGE UP (ref 98–107)
CO2 SERPL-SCNC: 25 MMOL/L — SIGNIFICANT CHANGE UP (ref 22–29)
CREAT SERPL-MCNC: 0.7 MG/DL — SIGNIFICANT CHANGE UP (ref 0.5–1.3)
EOSINOPHIL # BLD AUTO: 0.09 K/UL — SIGNIFICANT CHANGE UP (ref 0–0.5)
EOSINOPHIL NFR BLD AUTO: 0.7 % — SIGNIFICANT CHANGE UP (ref 0–6)
GLUCOSE SERPL-MCNC: 90 MG/DL — SIGNIFICANT CHANGE UP (ref 70–99)
HCT VFR BLD CALC: 27.2 % — LOW (ref 34.5–45)
HGB BLD-MCNC: 8.9 G/DL — LOW (ref 11.5–15.5)
IMM GRANULOCYTES NFR BLD AUTO: 0.5 % — SIGNIFICANT CHANGE UP (ref 0–1.5)
LYMPHOCYTES # BLD AUTO: 2.92 K/UL — SIGNIFICANT CHANGE UP (ref 1–3.3)
LYMPHOCYTES # BLD AUTO: 22.5 % — SIGNIFICANT CHANGE UP (ref 13–44)
MAGNESIUM SERPL-MCNC: 1.7 MG/DL — SIGNIFICANT CHANGE UP (ref 1.6–2.6)
MCHC RBC-ENTMCNC: 26.5 PG — LOW (ref 27–34)
MCHC RBC-ENTMCNC: 32.7 GM/DL — SIGNIFICANT CHANGE UP (ref 32–36)
MCV RBC AUTO: 81 FL — SIGNIFICANT CHANGE UP (ref 80–100)
MONOCYTES # BLD AUTO: 0.83 K/UL — SIGNIFICANT CHANGE UP (ref 0–0.9)
MONOCYTES NFR BLD AUTO: 6.4 % — SIGNIFICANT CHANGE UP (ref 2–14)
NEUTROPHILS # BLD AUTO: 9.04 K/UL — HIGH (ref 1.8–7.4)
NEUTROPHILS NFR BLD AUTO: 69.7 % — SIGNIFICANT CHANGE UP (ref 43–77)
PHOSPHATE SERPL-MCNC: 2.5 MG/DL — SIGNIFICANT CHANGE UP (ref 2.4–4.7)
PLATELET # BLD AUTO: 314 K/UL — SIGNIFICANT CHANGE UP (ref 150–400)
POTASSIUM SERPL-MCNC: 3.9 MMOL/L — SIGNIFICANT CHANGE UP (ref 3.5–5.3)
POTASSIUM SERPL-SCNC: 3.9 MMOL/L — SIGNIFICANT CHANGE UP (ref 3.5–5.3)
RBC # BLD: 3.36 M/UL — LOW (ref 3.8–5.2)
RBC # FLD: 12.8 % — SIGNIFICANT CHANGE UP (ref 10.3–14.5)
SODIUM SERPL-SCNC: 137 MMOL/L — SIGNIFICANT CHANGE UP (ref 135–145)
WBC # BLD: 12.97 K/UL — HIGH (ref 3.8–10.5)
WBC # FLD AUTO: 12.97 K/UL — HIGH (ref 3.8–10.5)

## 2021-10-13 PROCEDURE — 99024 POSTOP FOLLOW-UP VISIT: CPT | Mod: GC

## 2021-10-13 RX ORDER — FAMOTIDINE 10 MG/ML
20 INJECTION INTRAVENOUS DAILY
Refills: 0 | Status: DISCONTINUED | OUTPATIENT
Start: 2021-10-13 | End: 2021-10-14

## 2021-10-13 RX ORDER — NORETHINDRONE AND ETHINYL ESTRADIOL 0.4-0.035
0 KIT ORAL
Qty: 0 | Refills: 0 | DISCHARGE

## 2021-10-13 RX ADMIN — Medication 1000 MILLIGRAM(S): at 00:06

## 2021-10-13 RX ADMIN — SODIUM CHLORIDE 125 MILLILITER(S): 9 INJECTION, SOLUTION INTRAVENOUS at 02:09

## 2021-10-13 RX ADMIN — Medication 400 MILLIGRAM(S): at 05:37

## 2021-10-13 RX ADMIN — FAMOTIDINE 100 MILLIGRAM(S): 10 INJECTION INTRAVENOUS at 18:17

## 2021-10-13 RX ADMIN — ENOXAPARIN SODIUM 40 MILLIGRAM(S): 100 INJECTION SUBCUTANEOUS at 05:37

## 2021-10-13 RX ADMIN — Medication 1000 MILLIGRAM(S): at 05:52

## 2021-10-13 RX ADMIN — HYDROMORPHONE HYDROCHLORIDE 0.5 MILLIGRAM(S): 2 INJECTION INTRAMUSCULAR; INTRAVENOUS; SUBCUTANEOUS at 09:37

## 2021-10-13 RX ADMIN — SODIUM CHLORIDE 125 MILLILITER(S): 9 INJECTION, SOLUTION INTRAVENOUS at 10:32

## 2021-10-13 RX ADMIN — HYDROMORPHONE HYDROCHLORIDE 1 MILLIGRAM(S): 2 INJECTION INTRAMUSCULAR; INTRAVENOUS; SUBCUTANEOUS at 20:38

## 2021-10-13 RX ADMIN — HYDROMORPHONE HYDROCHLORIDE 0.5 MILLIGRAM(S): 2 INJECTION INTRAMUSCULAR; INTRAVENOUS; SUBCUTANEOUS at 01:40

## 2021-10-13 RX ADMIN — HYDROMORPHONE HYDROCHLORIDE 0.5 MILLIGRAM(S): 2 INJECTION INTRAMUSCULAR; INTRAVENOUS; SUBCUTANEOUS at 01:55

## 2021-10-13 RX ADMIN — HYDROMORPHONE HYDROCHLORIDE 1 MILLIGRAM(S): 2 INJECTION INTRAMUSCULAR; INTRAVENOUS; SUBCUTANEOUS at 20:53

## 2021-10-13 RX ADMIN — HYDROMORPHONE HYDROCHLORIDE 0.5 MILLIGRAM(S): 2 INJECTION INTRAMUSCULAR; INTRAVENOUS; SUBCUTANEOUS at 09:52

## 2021-10-13 NOTE — PROGRESS NOTE ADULT - ASSESSMENT
41F here for elective b/l oophorectomy/salpingectomy, intraoperative ACS consult for iatrogenic sigmoid perforation POD1 resection of 10cm of sigmoid colon for iatrogenic 60% full thickness perforation with 4cm surrounding deserosalization. Isoperistaltic colocolonic anastomosis with 2 layer handsewn closure of common colostomy    Plan  CLD and advance as tolerated with return of bowel function  No indication for antibiotics past 24hrs postop from general surgery standpoint  Rest of care per primary team  DVT ppx   41F here for elective b/l oophorectomy/salpingectomy, intraoperative ACS consult for iatrogenic sigmoid perforation POD1 resection of 10cm of sigmoid colon for iatrogenic 60% full thickness perforation with 4cm surrounding deserosalization. Isoperistaltic colocolonic anastomosis with 2 layer handsewn closure of common colostomy    Plan  CLD, Keep on CLD with nausea overnight  No indication for antibiotics past 24hrs postop from general surgery standpoint  Rest of care per primary team  DVT ppx   Patient is 41 year-old female who came for an  elective b/l oophorectomy/salpingectomy, intraoperative ACS consult for iatrogenic sigmoid perforation POD2 resection of 10cm of sigmoid colon for iatrogenic 60% full thickness perforation with 4cm surrounding deserosalization. Isoperistaltic colocolonic anastomosis with 2 layer handsewn closure of common colostomy    Plan  CLD, Keep on CLD with nausea overnight  No indication for antibiotics past 24hrs postop from general surgery standpoint  Rest of care per primary team  DVT ppx

## 2021-10-13 NOTE — CHART NOTE - NSCHARTNOTEFT_GEN_A_CORE
10/13/2021 1:00PM  Patient seen and examined at bedside. Pt reports tolerating liquid diet well. Denies n/v. Desires regular diet.     Vitals:  T(F): 98.6 (13 Oct 2021 09:44), Max: 99.2 (13 Oct 2021 00:02)  HR: 109 (13 Oct 2021 09:44) (61 - 109)  BP: 125/76 (13 Oct 2021 09:44) (117/76 - 135/81)  RR: 18 (13 Oct 2021 09:44) (17 - 18)  SpO2: 97% (13 Oct 2021 09:44) (95% - 97%)    Physical exam deferred b/c pt just got out of shower.    A/P  Pt currently tolerating liquid diet, desires to advance regular diet. awaiting ACS recs, will f/u.  Continue with current pain regimen.   Continue with routine postop care. 10/13/2021 1:00PM  Patient seen and examined at bedside. Pt reports tolerating liquid diet well. Denies n/v. Desires regular diet.     Vitals:  T(F): 98.6 (13 Oct 2021 09:44), Max: 99.2 (13 Oct 2021 00:02)  HR: 109 (13 Oct 2021 09:44) (61 - 109)  BP: 125/76 (13 Oct 2021 09:44) (117/76 - 135/81)  RR: 18 (13 Oct 2021 09:44) (17 - 18)  SpO2: 97% (13 Oct 2021 09:44) (95% - 97%)    Physical exam deferred b/c pt just got out of shower.    A/P  Pt currently tolerating liquid diet, desires to advance regular diet. awaiting ACS recs, will f/u.  Continue with current pain regimen.   Continue with routine postop care 10/13/2021 1:00PM  Patient seen and examined at bedside. Pt reports tolerating liquid diet well. Denies n/v. Desires regular diet.     Vitals:  T(F): 98.6 (13 Oct 2021 09:44), Max: 99.2 (13 Oct 2021 00:02)  HR: 109 (13 Oct 2021 09:44) (61 - 109)  BP: 125/76 (13 Oct 2021 09:44) (117/76 - 135/81)  RR: 18 (13 Oct 2021 09:44) (17 - 18)  SpO2: 97% (13 Oct 2021 09:44) (95% - 97%)    Physical exam deferred b/c pt just got out of shower.    A/P  Pt currently tolerating liquid diet, will advance to regular diet  Continue with current pain regimen.   Continue with routine postop care

## 2021-10-13 NOTE — PROGRESS NOTE ADULT - ASSESSMENT
NAE PROCTOR is a 41y now POD#2 s/p exploratory lap, BS, bilateral ovarian cystectomies, with sigmoid resection and side-to-side re-anastomosis    Neuro: Pain well controlled. Continue current pain regimen.  CV: No history of cardiovascular disease. Blood pressure well controlled.  Pulm: No active disease. Saturating well on room air. Incentive spirometer use encouraged  GI: s/p sigmoid resection with side-to-side re-anastomosis. Bowel sounds present. No N/V. Pt is on CLD, unable to tolerate with nausea and vomiting episode overnight. Will continue with CLD and will reevaluate  : Hernandez removed, voiding spontaneously.  Heme: Hgb 11.7 -> 10.3  ID: Afebrile. No antibiotics indicated at this time.   Endo: No active disease.   DVT ppx: Ambulation encouraged, SCDs when in bed, lovenox ordered.  Dispo: continue inpatient care, when meeting post op milestones and pending attending approval

## 2021-10-13 NOTE — PROGRESS NOTE ADULT - SUBJECTIVE AND OBJECTIVE BOX
NAE PROCTOR is a 41y now POD#2 s/p exploratory lap, BS, bilateral ovarian cystectomies, with sigmoid resection and side-to-side re-anastomosis    S:    No acute events overnight.   Patient was seen and examined at bedside.   She continues to have nausea and vomited once overnight around 10pm. She otherwise has no other complaints and would like to shower.  Pain is well controlled with current treatment regimen.   Tolerating sips of water. Unable to tolerate CLD.    She is ambulating without difficulty  +flatus/-BM/+ voiding  She denies lightheadedness, dizziness, palpitations, chest pain and SOB.     O:   T(F): 98 (13 Oct 2021 04:38), Max: 99.2 (13 Oct 2021 00:02)  HR: 82 (13 Oct 2021 04:38) (61 - 93)  BP: 121/74 (13 Oct 2021 04:38) (117/76 - 135/81)  RR: 18 (13 Oct 2021 04:38) (17 - 18)  SpO2: 95% (13 Oct 2021 04:38) (95% - 95%)    Gen: NAD, AAOx3  CV: RRR, S1 S2 present  Pulm: CTAB  Abdomen: Soft, nondistended, appropriately tender, + BS   Pelvic: Pad inspected with scant blood.  Incision: Clean, dry and intact.  Extremities: No calf tenderness or edema     Labs:       10-11-21 @ 07:01  -  10-12-21 @ 07:00  --------------------------------------------------------  IN: 0 mL / OUT: 800 mL / NET: -800 mL

## 2021-10-13 NOTE — PROGRESS NOTE ADULT - SUBJECTIVE AND OBJECTIVE BOX
Acute Care Surgery/Trauma Surgery Progress Note:  No acute overnight events. Patient afebrile, VSS. Pain well controlled. Tolerating clear liquids without N/V.  VSS.    Diet, Clear Liquid (10-12-21 @ 15:25)      Scheduled Medications:   acetaminophen  IVPB .. 1000 milliGRAM(s) IV Intermittent every 6 hours  enoxaparin Injectable 40 milliGRAM(s) SubCutaneous every 24 hours  multiple electrolytes Injection Type 1 1000 milliLiter(s) (125 mL/Hr) IV Continuous <Continuous>  sodium chloride 0.9% lock flush 3 milliLiter(s) IV Push every 8 hours    PRN Medications:  HYDROmorphone  Injectable 0.5 milliGRAM(s) IV Push every 3 hours PRN Moderate Pain (4 - 6)  HYDROmorphone  Injectable 1 milliGRAM(s) IV Push every 3 hours PRN Severe Pain (7 - 10)  ondansetron Injectable 4 milliGRAM(s) IV Push every 4 hours PRN Nausea and/or Vomiting      Objective:   T(F): 99.2 (10-13 @ 00:02), Max: 99.2 (10-13 @ 00:02)  HR: 93 (10-13 @ 00:02) (61 - 93)  BP: 135/81 (10-13 @ 00:02) (117/76 - 135/81)  BP(mean): --  ABP: --  ABP(mean): --  RR: 17 (10-13 @ 00:02) (17 - 18)  SpO2: 95% (10-13 @ 00:02) (95% - 95%)      Physical Exam:   Constitutional: patient resting comfortably in bed, in no acute distress  HEENT: EOMI, PERRLA, MMM.  Respiratory: Non labored breathing on RA  Cardiovascular: RRR  Gastrointestinal: Abdomen soft, non-tender, non-distended, no rebound tenderness / guarding dressing C/D/I  Musculoskeletal: No joint pain, swelling or deformity; no limitation of movement  Vascular: Extremities warm and well perfused.     I&O's    10-11 @ 07:01  -  10-12 @ 07:00  --------------------------------------------------------  IN:  Total IN: 0 mL    OUT:    Indwelling Catheter - Urethral (mL): 550 mL    Voided (mL): 250 mL  Total OUT: 800 mL    Total NET: -800 mL      10-12 @ 07:01  -  10-13 @ 04:18  --------------------------------------------------------  IN:    multiple electrolytes Injection Type 1.: 2125 mL  Total IN: 2125 mL    OUT:    Emesis (mL): 400 mL    Voided (mL): 200 mL  Total OUT: 600 mL    Total NET: 1525 mL          LABS:                        10.3   18.43 )-----------( 382      ( 12 Oct 2021 06:57 )             31.4     10-12    136  |  98  |  6.9<L>  ----------------------------<  143<H>  4.0   |  19.0<L>  |  0.74    Ca    8.7      12 Oct 2021 06:57  Phos  2.3     10-12  Mg     1.4     10-12            MICROBIOLOGY:       PATHOLOGY:       Acute Care Surgery/Trauma Surgery Progress Note:  No acute overnight events. Patient afebrile, VSS. Pain well controlled. Tolerating minimal amount of CLD with nausea overnight, no vomiting.  VSS.    Diet, Clear Liquid (10-12-21 @ 15:25)      Scheduled Medications:   acetaminophen  IVPB .. 1000 milliGRAM(s) IV Intermittent every 6 hours  enoxaparin Injectable 40 milliGRAM(s) SubCutaneous every 24 hours  multiple electrolytes Injection Type 1 1000 milliLiter(s) (125 mL/Hr) IV Continuous <Continuous>  sodium chloride 0.9% lock flush 3 milliLiter(s) IV Push every 8 hours    PRN Medications:  HYDROmorphone  Injectable 0.5 milliGRAM(s) IV Push every 3 hours PRN Moderate Pain (4 - 6)  HYDROmorphone  Injectable 1 milliGRAM(s) IV Push every 3 hours PRN Severe Pain (7 - 10)  ondansetron Injectable 4 milliGRAM(s) IV Push every 4 hours PRN Nausea and/or Vomiting      Objective:   T(F): 99.2 (10-13 @ 00:02), Max: 99.2 (10-13 @ 00:02)  HR: 93 (10-13 @ 00:02) (61 - 93)  BP: 135/81 (10-13 @ 00:02) (117/76 - 135/81)  BP(mean): --  ABP: --  ABP(mean): --  RR: 17 (10-13 @ 00:02) (17 - 18)  SpO2: 95% (10-13 @ 00:02) (95% - 95%)      Physical Exam:   Constitutional: patient resting comfortably in bed, in no acute distress  HEENT: EOMI, PERRLA, MMM.  Respiratory: Non labored breathing on RA  Cardiovascular: RRR  Gastrointestinal: Abdomen soft, non-tender, non-distended, no rebound tenderness / guarding dressing C/D/I  Musculoskeletal: No joint pain, swelling or deformity; no limitation of movement  Vascular: Extremities warm and well perfused.     I&O's    10-11 @ 07:01  -  10-12 @ 07:00  --------------------------------------------------------  IN:  Total IN: 0 mL    OUT:    Indwelling Catheter - Urethral (mL): 550 mL    Voided (mL): 250 mL  Total OUT: 800 mL    Total NET: -800 mL      10-12 @ 07:01  -  10-13 @ 04:18  --------------------------------------------------------  IN:    multiple electrolytes Injection Type 1.: 2125 mL  Total IN: 2125 mL    OUT:    Emesis (mL): 400 mL    Voided (mL): 200 mL  Total OUT: 600 mL    Total NET: 1525 mL          LABS:                        10.3   18.43 )-----------( 382      ( 12 Oct 2021 06:57 )             31.4     10-12    136  |  98  |  6.9<L>  ----------------------------<  143<H>  4.0   |  19.0<L>  |  0.74    Ca    8.7      12 Oct 2021 06:57  Phos  2.3     10-12  Mg     1.4     10-12            MICROBIOLOGY:       PATHOLOGY:       Acute Care Surgery/Trauma Surgery Progress Note:    No acute overnight events. Patient afebrile, VSS. Pain well controlled. Tolerating minimal amount of CLD with nausea overnight, she vomited last night what she referrs to "foamy" liquid .  VSS.    Diet, Clear Liquid (10-12-21 @ 15:25)      Scheduled Medications:   acetaminophen  IVPB .. 1000 milliGRAM(s) IV Intermittent every 6 hours  enoxaparin Injectable 40 milliGRAM(s) SubCutaneous every 24 hours  multiple electrolytes Injection Type 1 1000 milliLiter(s) (125 mL/Hr) IV Continuous <Continuous>  sodium chloride 0.9% lock flush 3 milliLiter(s) IV Push every 8 hours    PRN Medications:  HYDROmorphone  Injectable 0.5 milliGRAM(s) IV Push every 3 hours PRN Moderate Pain (4 - 6)  HYDROmorphone  Injectable 1 milliGRAM(s) IV Push every 3 hours PRN Severe Pain (7 - 10)  ondansetron Injectable 4 milliGRAM(s) IV Push every 4 hours PRN Nausea and/or Vomiting      Objective:   T(F): 99.2 (10-13 @ 00:02), Max: 99.2 (10-13 @ 00:02)  HR: 93 (10-13 @ 00:02) (61 - 93)  BP: 135/81 (10-13 @ 00:02) (117/76 - 135/81)  BP(mean): --  ABP: --  ABP(mean): --  RR: 17 (10-13 @ 00:02) (17 - 18)  SpO2: 95% (10-13 @ 00:02) (95% - 95%)      Physical Exam:   Constitutional: patient resting comfortably in bed, in no acute distress  HEENT: EOMI, PERRLA, MMM.  Respiratory: Non labored breathing on RA  Cardiovascular: RRR  Gastrointestinal: Abdomen soft, non-tender, non-distended, no rebound tenderness / guarding dressing C/D/I  Musculoskeletal: No joint pain, swelling or deformity; no limitation of movement  Vascular: Extremities warm and well perfused.     I&O's    10-11 @ 07:01  -  10-12 @ 07:00  --------------------------------------------------------  IN:  Total IN: 0 mL    OUT:    Indwelling Catheter - Urethral (mL): 550 mL    Voided (mL): 250 mL  Total OUT: 800 mL    Total NET: -800 mL      10-12 @ 07:01  -  10-13 @ 04:18  --------------------------------------------------------  IN:    multiple electrolytes Injection Type 1.: 2125 mL  Total IN: 2125 mL    OUT:    Emesis (mL): 400 mL    Voided (mL): 200 mL  Total OUT: 600 mL    Total NET: 1525 mL          LABS:                        10.3   18.43 )-----------( 382      ( 12 Oct 2021 06:57 )             31.4     10-12    136  |  98  |  6.9<L>  ----------------------------<  143<H>  4.0   |  19.0<L>  |  0.74    Ca    8.7      12 Oct 2021 06:57  Phos  2.3     10-12  Mg     1.4     10-12

## 2021-10-14 ENCOUNTER — TRANSCRIPTION ENCOUNTER (OUTPATIENT)
Age: 41
End: 2021-10-14

## 2021-10-14 VITALS
OXYGEN SATURATION: 97 % | HEART RATE: 92 BPM | SYSTOLIC BLOOD PRESSURE: 115 MMHG | DIASTOLIC BLOOD PRESSURE: 65 MMHG | RESPIRATION RATE: 18 BRPM | TEMPERATURE: 98 F

## 2021-10-14 PROCEDURE — 88305 TISSUE EXAM BY PATHOLOGIST: CPT

## 2021-10-14 PROCEDURE — C1889: CPT

## 2021-10-14 PROCEDURE — 84100 ASSAY OF PHOSPHORUS: CPT

## 2021-10-14 PROCEDURE — 83735 ASSAY OF MAGNESIUM: CPT

## 2021-10-14 PROCEDURE — 86901 BLOOD TYPING SEROLOGIC RH(D): CPT

## 2021-10-14 PROCEDURE — 88307 TISSUE EXAM BY PATHOLOGIST: CPT

## 2021-10-14 PROCEDURE — 85025 COMPLETE CBC W/AUTO DIFF WBC: CPT

## 2021-10-14 PROCEDURE — 86769 SARS-COV-2 COVID-19 ANTIBODY: CPT

## 2021-10-14 PROCEDURE — 86900 BLOOD TYPING SEROLOGIC ABO: CPT

## 2021-10-14 PROCEDURE — 99024 POSTOP FOLLOW-UP VISIT: CPT | Mod: GC

## 2021-10-14 PROCEDURE — 86850 RBC ANTIBODY SCREEN: CPT

## 2021-10-14 PROCEDURE — 36415 COLL VENOUS BLD VENIPUNCTURE: CPT

## 2021-10-14 PROCEDURE — 88302 TISSUE EXAM BY PATHOLOGIST: CPT

## 2021-10-14 PROCEDURE — 80048 BASIC METABOLIC PNL TOTAL CA: CPT

## 2021-10-14 PROCEDURE — 85027 COMPLETE CBC AUTOMATED: CPT

## 2021-10-14 RX ADMIN — SODIUM CHLORIDE 125 MILLILITER(S): 9 INJECTION, SOLUTION INTRAVENOUS at 02:58

## 2021-10-14 RX ADMIN — ENOXAPARIN SODIUM 40 MILLIGRAM(S): 100 INJECTION SUBCUTANEOUS at 05:33

## 2021-10-14 NOTE — PROGRESS NOTE ADULT - SUBJECTIVE AND OBJECTIVE BOX
SUBJECTIVE/24 hour events:  NAE PROCTOR is a 41y now POD#3 s/p exploratory lap, BS, bilateral ovarian cystectomies, with sigmoid resection and side-to-side re-anastomosis . No acute events overnight, pain controlled on current regimen, now tolerating a regular diet, afebrile. Patient with no requests or complaints at this time       Vital Signs Last 24 Hrs  T(C): 37.4 (14 Oct 2021 03:20), Max: 37.4 (14 Oct 2021 03:20)  T(F): 99.3 (14 Oct 2021 03:20), Max: 99.3 (14 Oct 2021 03:20)  HR: 87 (14 Oct 2021 03:20) (84 - 109)  BP: 135/81 (14 Oct 2021 03:20) (124/81 - 135/81)  BP(mean): --  RR: 16 (14 Oct 2021 03:20) (16 - 18)  SpO2: 97% (14 Oct 2021 03:20) (97% - 98%)  Drug Dosing Weight  Height (cm): 152.4 (11 Oct 2021 13:10)  Weight (kg): 83.4 (11 Oct 2021 13:10)  BMI (kg/m2): 35.9 (11 Oct 2021 13:10)  BSA (m2): 1.8 (11 Oct 2021 13:10)  I&O's Detail    12 Oct 2021 07:01  -  13 Oct 2021 07:00  --------------------------------------------------------  IN:    IV PiggyBack: 200 mL    multiple electrolytes Injection Type 1.: 2500 mL  Total IN: 2700 mL    OUT:    Emesis (mL): 400 mL    Voided (mL): 200 mL  Total OUT: 600 mL    Total NET: 2100 mL      13 Oct 2021 07:01  -  14 Oct 2021 05:52  --------------------------------------------------------  IN:    multiple electrolytes Injection Type 1.: 2250 mL  Total IN: 2250 mL    OUT:  Total OUT: 0 mL    Total NET: 2250 mL        Allergies    No Known Allergies    Intolerances                              8.9    12.97 )-----------( 314      ( 13 Oct 2021 07:09 )             27.2   10-13    137  |  104  |  5.3<L>  ----------------------------<  90  3.9   |  25.0  |  0.70    Ca    7.9<L>      13 Oct 2021 07:09  Phos  2.5     10-13  Mg     1.7     10-13        ROS:    PHYSICAL EXAM:  Constitutional: patient resting comfortably in bed, in no acute distress  HEENT: EOMI, PERRLA, MMM.  Respiratory: Non labored breathing on RA  Cardiovascular: RRR  Gastrointestinal: Abdomen soft, non-tender, non-distended, no rebound tenderness / guarding dressing C/D/I  Musculoskeletal: No joint pain, swelling or deformity; no limitation of movement  Vascular: Extremities warm and well perfused.         MEDICATIONS  (STANDING):  enoxaparin Injectable 40 milliGRAM(s) SubCutaneous every 24 hours  famotidine  IVPB 20 milliGRAM(s) IV Intermittent daily  multiple electrolytes Injection Type 1 1000 milliLiter(s) (125 mL/Hr) IV Continuous <Continuous>  sodium chloride 0.9% lock flush 3 milliLiter(s) IV Push every 8 hours    MEDICATIONS  (PRN):  HYDROmorphone  Injectable 0.5 milliGRAM(s) IV Push every 3 hours PRN Moderate Pain (4 - 6)  HYDROmorphone  Injectable 1 milliGRAM(s) IV Push every 3 hours PRN Severe Pain (7 - 10)  ondansetron Injectable 4 milliGRAM(s) IV Push every 4 hours PRN Nausea and/or Vomiting      RADIOLOGY STUDIES:    CULTURES:

## 2021-10-14 NOTE — DISCHARGE NOTE PROVIDER - PROVIDER TOKENS
PROVIDER:[TOKEN:[[id number]],FOLLOWUP:[2 weeks]],PROVIDER:[TOKEN:[[id number]],FOLLOWUP:[2 weeks]]

## 2021-10-14 NOTE — DISCHARGE NOTE NURSING/CASE MANAGEMENT/SOCIAL WORK - PATIENT PORTAL LINK FT
You can access the FollowMyHealth Patient Portal offered by [facility] by registering at the following website: [email or web address]. By joining [facility]’s FollowMyHealth portal, you will also be able to view your health information using other applications (apps) compatible with our system.

## 2021-10-14 NOTE — PROGRESS NOTE ADULT - SUBJECTIVE AND OBJECTIVE BOX
NAE PROCTOR is a 41y now POD#3 s/p exploratory lap, BS, bilateral ovarian cystectomies, with sigmoid resection and side-to-side re-anastomosis    S:    No acute events overnight.   Patient was seen and examined at bedside.   She tolerated small amount of PO intake. Denies vomiting. Reported mild nausea. She otherwise has no other complaints.  Pain is well controlled with current treatment regimen.   Tolerating sips of water. Unable to tolerate CLD.    She is ambulating without difficulty  +flatus/-BM/+ voiding  She denies lightheadedness, dizziness, palpitations, chest pain and SOB.     O:   T(F): 99.3 (14 Oct 2021 03:20), Max: 99.3 (14 Oct 2021 03:20)  HR: 87 (14 Oct 2021 03:20) (84 - 109)  BP: 135/81 (14 Oct 2021 03:20) (124/81 - 135/81)  RR: 16 (14 Oct 2021 03:20) (16 - 18)  SpO2: 97% (14 Oct 2021 03:20) (97% - 98%)    Gen: NAD, AAOx3  CV: RRR, S1 S2 present  Pulm: CTAB  Abdomen: Soft, nondistended, appropriately tender, + BS   Pelvic: Pad inspected with scant blood.  Incision: Clean, dry and intact.  Extremities: No calf tenderness or edema     Labs:                         8.9    12.97 )-----------( 314      ( 13 Oct 2021 07:09 )             27.2

## 2021-10-14 NOTE — PROGRESS NOTE ADULT - ASSESSMENT
NAE PROCTOR is a 41y now POD#3 s/p exploratory lap, BS, bilateral ovarian cystectomies, with sigmoid resection and side-to-side re-anastomosis    Neuro: Pain well controlled. Continue current pain regimen.  CV: No history of cardiovascular disease. Blood pressure well controlled.  Pulm: No active disease. Saturating well on room air. Incentive spirometer use encouraged  GI: s/p sigmoid resection with side-to-side re-anastomosis. Bowel sounds present. Tolerated small amount of PO intake. Mild nausea, denies vomiting. Will continue to take it slow with her diet and reassess as needed.  : Hernandez removed, voiding spontaneously.  Heme: Hgb 11.7 -> 10.3  ID: Afebrile. No antibiotics indicated at this time.   Endo: No active disease.   DVT ppx: Ambulation encouraged, SCDs when in bed, lovenox ordered.  Dispo: continue inpatient care, when meeting post op milestones and pending attending approval   NAE PROCTOR is a 41y now POD#3 s/p exploratory lap, BS, bilateral ovarian cystectomies, with sigmoid resection and side-to-side re-anastomosis    Neuro: Pain well controlled. Continue current pain regimen.  CV: No history of cardiovascular disease. Blood pressure well controlled.  Pulm: No active disease. Saturating well on room air. Incentive spirometer use encouraged  GI: s/p sigmoid resection with side-to-side re-anastomosis. Bowel sounds present. Tolerated small amount of PO intake. Mild nausea, denies vomiting. Will continue to take it slow with her diet and reassess as needed.  : Hernandez removed, voiding spontaneously.  Heme: Hgb 11.7 -> 10.3  ID: Afebrile. No antibiotics indicated at this time.   Endo: No active disease.   DVT ppx: Ambulation encouraged, SCDs when in bed, lovenox ordered.  Dispo: Patient cleared for discharge per Gen Surg, will DC home w/ f/u in 2 weeks    d/w Dr. Doyle

## 2021-10-14 NOTE — PROGRESS NOTE ADULT - ATTENDING COMMENTS
Pt seen and examined by me  agree with findings  Pt currently vomiting-NPO for now  IV meds  OOB  await return bowel function
Pt seen and examined by me   agree with findings
Pt seen and examined by me  agree with findings  mild nausea  stay on clears until return bowel function

## 2021-10-14 NOTE — DISCHARGE NOTE PROVIDER - HOSPITAL COURSE
Patient s/p exploratory lap, BS, bilateral ovarian cystectomies, with sigmoid resection and side-to-side re-anastomosis. Post-op course uncomplicated. Her pain was well controlled. She is tolerating a regular diet. She is ambulating independently. She was able to void after removal of peña. Patient with flatus. Labs and Vitals WNL upon discharge.

## 2021-10-14 NOTE — PROGRESS NOTE ADULT - ASSESSMENT
Patient is 41 year-old female who came for an  elective b/l oophorectomy/salpingectomy, intraoperative ACS consult for iatrogenic sigmoid perforation POD2 resection of 10cm of sigmoid colon for iatrogenic 60% full thickness perforation with 4cm surrounding deserosalization. Isoperistaltic colocolonic anastomosis with 2 layer handsewn closure of common colostomy    Plan  regular diet  No indication for antibiotics past 24hrs postop from general surgery standpoint  Rest of care per primary team  DVT ppx Patient is 41 year-old female who came for an  elective b/l oophorectomy/salpingectomy, intraoperative ACS consult for iatrogenic sigmoid perforation POD2 resection of 10cm of sigmoid colon for iatrogenic 60% full thickness perforation with 4cm surrounding deserosalization. Isoperistaltic colocolonic anastomosis with 2 layer handsewn closure of common colostomy    Plan  Continue regular diet  No indication for antibiotics past 24hrs postop from general surgery standpoint  No further recommendations, can follow up in ACS clinic 2 weeks after DC, will sign off, call with questions  Rest of care per primary team  DVT ppx

## 2021-10-14 NOTE — DISCHARGE NOTE PROVIDER - NSDCCPTREATMENT_GEN_ALL_CORE_FT
PRINCIPAL PROCEDURE  Procedure: Exploratory laparotomy  Findings and Treatment:       SECONDARY PROCEDURE  Procedure: Colectomy, open  Findings and Treatment: resection of 10cm of sigmoid colon for iatrogenic 60% full thickness perforation with 4cm surrounding deserosalization. Isoperistaltic colocolonic anastomosis with 2 layer handsewn closure of common colotomy.

## 2021-10-14 NOTE — DISCHARGE NOTE PROVIDER - NSDCMRMEDTOKEN_GEN_ALL_CORE_FT
JUNEL FE 1.5/30  1.5-30 MG-MCG TABS:   oxyCODONE-acetaminophen 5 mg-325 mg oral tablet: 1 tab(s) orally every 6 hours, As Needed -Moderate Pain (4 - 6) MDD:4 tabs

## 2021-10-14 NOTE — DISCHARGE NOTE PROVIDER - CARE PROVIDER_API CALL
Mariola Hyman)  Surgery; Surgical Critical Care  [address], [place] [id number]  Phone: [phone]  Fax: [phone]  Follow Up Time: 2 weeks    Toñito Doyle)  Obstetrics and Gynecology  [address], [address]  [place] [address]  Phone: [phone]  Fax: [phone]  Follow Up Time: 2 weeks

## 2021-10-15 LAB — SURGICAL PATHOLOGY STUDY: SIGNIFICANT CHANGE UP

## 2021-10-20 ENCOUNTER — APPOINTMENT (OUTPATIENT)
Dept: OBGYN | Facility: CLINIC | Age: 41
End: 2021-10-20
Payer: MEDICAID

## 2021-10-20 VITALS
BODY MASS INDEX: 34.21 KG/M2 | HEIGHT: 60 IN | DIASTOLIC BLOOD PRESSURE: 84 MMHG | WEIGHT: 174.25 LBS | HEART RATE: 92 BPM | SYSTOLIC BLOOD PRESSURE: 134 MMHG

## 2021-10-20 PROCEDURE — 99024 POSTOP FOLLOW-UP VISIT: CPT

## 2021-12-01 ENCOUNTER — APPOINTMENT (OUTPATIENT)
Dept: OBGYN | Facility: CLINIC | Age: 41
End: 2021-12-01
Payer: MEDICAID

## 2021-12-01 VITALS
SYSTOLIC BLOOD PRESSURE: 151 MMHG | WEIGHT: 178 LBS | BODY MASS INDEX: 34.95 KG/M2 | HEIGHT: 60 IN | DIASTOLIC BLOOD PRESSURE: 86 MMHG

## 2021-12-01 DIAGNOSIS — Z09 ENCOUNTER FOR FOLLOW-UP EXAMINATION AFTER COMPLETED TREATMENT FOR CONDITIONS OTHER THAN MALIGNANT NEOPLASM: ICD-10-CM

## 2021-12-01 PROCEDURE — 99024 POSTOP FOLLOW-UP VISIT: CPT

## 2024-10-22 NOTE — H&P PST ADULT - HEMATOLOGY/LYMPHATICS
3.  Pt cervical rotation and SB AROM will improve to WFL to allow pt to drive with increased ease.  Eval:  Cervical  AROM   SB right 38   SB left  24   Rotation right 78   Rotation left 64      4.  Pt NDI disability score will improve to < 10%  to show improvement in subjective function.  Eval:28%    PLAN  Yes  Continue plan of care  []  Upgrade activities as tolerated  []  Discharge due to :  []  Other:    Marleny French PT    10/22/2024    4:23 PM    Future Appointments   Date Time Provider Department Center   10/22/2024  4:30 PM [facility]   12/6/2024  8:15 AM Kev Metz MD [facility]   12/9/2024  3:15 PM Go Jolly MD [facility] BS [facility]   4/8/2025  1:30 PM Bentley Ly MD [facility] DEP         
negative